# Patient Record
Sex: MALE | Race: WHITE | URBAN - METROPOLITAN AREA
[De-identification: names, ages, dates, MRNs, and addresses within clinical notes are randomized per-mention and may not be internally consistent; named-entity substitution may affect disease eponyms.]

---

## 2023-03-27 ENCOUNTER — OFFICE VISIT (OUTPATIENT)
Facility: LOCATION | Age: 78
End: 2023-03-27
Payer: MEDICARE

## 2023-03-27 DIAGNOSIS — H43.811 VITREOUS DEGENERATION, RIGHT EYE: ICD-10-CM

## 2023-03-27 DIAGNOSIS — H16.223 KERATOCONJUNCT SICCA, NOT SPECIFIED AS SJOGREN'S, BILATERAL: ICD-10-CM

## 2023-03-27 DIAGNOSIS — H04.123 DRY EYE SYNDROME OF BILATERAL LACRIMAL GLANDS: ICD-10-CM

## 2023-03-27 DIAGNOSIS — E11.9 TYPE 2 DIABETES MELLITUS WITHOUT COMPLICATIONS: ICD-10-CM

## 2023-03-27 DIAGNOSIS — H26.492 OTHER SECONDARY CATARACT, LEFT EYE: Primary | ICD-10-CM

## 2023-03-27 PROCEDURE — 99214 OFFICE O/P EST MOD 30 MIN: CPT | Performed by: OPHTHALMOLOGY

## 2023-03-27 ASSESSMENT — INTRAOCULAR PRESSURE
OD: 13
OS: 15

## 2023-03-27 NOTE — IMPRESSION/PLAN
Impression: Mild PCO OS. Patient reports happy with [facility] OS at this time, non-symptomatic. Plan: Will revisit once patient visually bothered by PCO. RTC in 1 year with DFE OU.

## 2023-03-27 NOTE — IMPRESSION/PLAN
Impression:  Examination revealed a posterior vitreous detachment. Plan: Discussed pathophysiology of PVD, discussed S/S of RD, patient to follow up ASAP with increasing flashes, floaters, curtain or shadow.

## 2023-03-27 NOTE — IMPRESSION/PLAN
Impression: Type 2 diabetes mellitus without complications: T45.4. Last A1C 6.2, per patient. Plan: Dilated examiantion today showed no signs of diabetic retinopathy in both eyes. Plan:
Keep all follow ups with PCP. Continue to monitor yearly with dilated examinations. RTC in 1 year for DFE OU.

## 2023-03-27 NOTE — IMPRESSION/PLAN
Impression: Examination revealed dry eye syndrome secondary to tear deficiencies. Plan: Patient to continue using ATs QID OU.